# Patient Record
(demographics unavailable — no encounter records)

---

## 2025-07-16 NOTE — DISCUSSION/SUMMARY
[FreeTextEntry1] : Ms Carrillo has a history of mild orthostatic hypotension without any symptoms.  Her exam today showed a repeat blood pressure of about 130/70 which dropped to 110/60 when she stood.  She was not symptomatic.  Her chest was clear and her cardiac exam within normal limits.  An EKG showed sinus bradycardia and borderline ST segment depression.  Since she is asymptomatic and has only minor drops in her blood pressure when she stands, I do not think she needs to start midodrine or to make any radical changes in her life.  She does not eat much salt and I encouraged her to increase this a little and I also suggested that she wear support stockings and get out of bed slowly.  As long as the pattern is unchanged she can simply continue with these measures.  She will follow-up here in 6 months. [EKG obtained to assist in diagnosis and management of assessed problem(s)] : EKG obtained to assist in diagnosis and management of assessed problem(s)

## 2025-07-16 NOTE — REASON FOR VISIT
[FreeTextEntry1] : 96-year-old female self-referred for cardiac evaluation because of a history of orthostatic hypotension.  She is in good general health and quite functional living independently and remaining physically active.  She has a history of hypertension and hypercholesterolemia, but no history of heart disease.  Last September she fell several times during the night.  She was not syncopal or presyncopal, but injured herself and was taken to the hospital.  During the hospitalization she was noted to have orthostatic changes in her blood pressure which she has had ever since.  She is no longer taking any antihypertensive.  She takes her blood pressure regularly at home averaging about 130/70 when she is lying down and about 110/60 when she is standing.  She has never fainted and has no orthostatic symptoms at present.  She had a TIA while she was in the hospital and was started on Plavix.  She is hypercholesterolemic.  She was started on rosuvastatin 20 mg during her current hospitalization.  She believes her cholesterol is well-controlled at present.